# Patient Record
Sex: FEMALE | Race: WHITE
[De-identification: names, ages, dates, MRNs, and addresses within clinical notes are randomized per-mention and may not be internally consistent; named-entity substitution may affect disease eponyms.]

---

## 2017-09-20 ENCOUNTER — RESULT REVIEW (OUTPATIENT)
Age: 47
End: 2017-09-20

## 2019-07-31 DIAGNOSIS — Z12.31 ENCOUNTER FOR SCREENING MAMMOGRAM FOR MALIGNANT NEOPLASM OF BREAST: ICD-10-CM

## 2019-07-31 PROBLEM — Z00.00 ENCOUNTER FOR PREVENTIVE HEALTH EXAMINATION: Status: ACTIVE | Noted: 2019-07-31

## 2019-08-02 ENCOUNTER — RECORD ABSTRACTING (OUTPATIENT)
Age: 49
End: 2019-08-02

## 2019-08-02 DIAGNOSIS — Z80.42 FAMILY HISTORY OF MALIGNANT NEOPLASM OF PROSTATE: ICD-10-CM

## 2019-08-02 DIAGNOSIS — Z83.3 FAMILY HISTORY OF DIABETES MELLITUS: ICD-10-CM

## 2019-08-02 DIAGNOSIS — Z78.9 OTHER SPECIFIED HEALTH STATUS: ICD-10-CM

## 2019-08-02 DIAGNOSIS — K90.0 CELIAC DISEASE: ICD-10-CM

## 2019-08-02 DIAGNOSIS — Z83.79 FAMILY HISTORY OF OTHER DISEASES OF THE DIGESTIVE SYSTEM: ICD-10-CM

## 2019-08-02 DIAGNOSIS — Z82.49 FAMILY HISTORY OF ISCHEMIC HEART DISEASE AND OTHER DISEASES OF THE CIRCULATORY SYSTEM: ICD-10-CM

## 2019-08-02 LAB — CYTOLOGY CVX/VAG DOC THIN PREP: NORMAL

## 2019-09-26 ENCOUNTER — RX RENEWAL (OUTPATIENT)
Age: 49
End: 2019-09-26

## 2019-10-30 ENCOUNTER — APPOINTMENT (OUTPATIENT)
Dept: OBGYN | Facility: CLINIC | Age: 49
End: 2019-10-30
Payer: COMMERCIAL

## 2019-10-30 PROCEDURE — 99396 PREV VISIT EST AGE 40-64: CPT

## 2020-09-15 ENCOUNTER — RX RENEWAL (OUTPATIENT)
Age: 50
End: 2020-09-15

## 2020-12-07 ENCOUNTER — RX RENEWAL (OUTPATIENT)
Age: 50
End: 2020-12-07

## 2020-12-15 ENCOUNTER — APPOINTMENT (OUTPATIENT)
Dept: OBGYN | Facility: CLINIC | Age: 50
End: 2020-12-15
Payer: COMMERCIAL

## 2020-12-15 VITALS
SYSTOLIC BLOOD PRESSURE: 110 MMHG | TEMPERATURE: 96.3 F | WEIGHT: 120 LBS | BODY MASS INDEX: 21.26 KG/M2 | DIASTOLIC BLOOD PRESSURE: 70 MMHG | HEIGHT: 63 IN

## 2020-12-15 DIAGNOSIS — Z01.419 ENCOUNTER FOR GYNECOLOGICAL EXAMINATION (GENERAL) (ROUTINE) W/OUT ABNORMAL FINDINGS: ICD-10-CM

## 2020-12-15 PROCEDURE — 99396 PREV VISIT EST AGE 40-64: CPT

## 2020-12-15 PROCEDURE — 99072 ADDL SUPL MATRL&STAF TM PHE: CPT

## 2020-12-20 PROBLEM — Z01.419 ENCOUNTER FOR ANNUAL ROUTINE GYNECOLOGICAL EXAMINATION: Status: ACTIVE | Noted: 2019-11-01

## 2020-12-20 NOTE — HISTORY OF PRESENT ILLNESS
[FreeTextEntry1] : 50 y o P0 female presents for routine annual GYN care. Her last pap smear was in 2017 and benign. She states she is well and denies GYN  complaints.\par She is not sexually active in a stable monogamous relationship and uses Sprintec on a 90 day basis for   control of menstrual migraines.\par She is currently working remotely as   insurance\par Mammogram in  6/2019 was benign BI-RADS 1\par Colonoscopy done at age 47y due to celiac was normal\par

## 2021-05-06 ENCOUNTER — RX RENEWAL (OUTPATIENT)
Age: 51
End: 2021-05-06

## 2021-05-10 ENCOUNTER — RX RENEWAL (OUTPATIENT)
Age: 51
End: 2021-05-10

## 2021-08-27 RX ORDER — NORGESTIMATE AND ETHINYL ESTRADIOL 0.25-0.035
0.25-35 KIT ORAL
Qty: 3 | Refills: 0 | Status: ACTIVE | COMMUNITY

## 2021-08-27 RX ORDER — NORGESTIMATE AND ETHINYL ESTRADIOL 0.25-0.035
0.25-35 KIT ORAL
Qty: 84 | Refills: 2 | Status: ACTIVE | COMMUNITY
Start: 2020-09-15 | End: 1900-01-01

## 2022-01-10 ENCOUNTER — RX RENEWAL (OUTPATIENT)
Age: 52
End: 2022-01-10

## 2022-03-28 ENCOUNTER — RX CHANGE (OUTPATIENT)
Age: 52
End: 2022-03-28

## 2022-06-23 ENCOUNTER — RX RENEWAL (OUTPATIENT)
Age: 52
End: 2022-06-23